# Patient Record
Sex: FEMALE | Race: WHITE
[De-identification: names, ages, dates, MRNs, and addresses within clinical notes are randomized per-mention and may not be internally consistent; named-entity substitution may affect disease eponyms.]

---

## 2017-12-28 ENCOUNTER — HOSPITAL ENCOUNTER (OUTPATIENT)
Dept: HOSPITAL 80 - CIMAGING | Age: 52
End: 2017-12-28
Attending: NURSE PRACTITIONER
Payer: COMMERCIAL

## 2017-12-28 DIAGNOSIS — Z12.31: Primary | ICD-10-CM

## 2017-12-28 PROCEDURE — G0202 SCR MAMMO BI INCL CAD: HCPCS

## 2018-12-12 ENCOUNTER — HOSPITAL ENCOUNTER (OUTPATIENT)
Dept: HOSPITAL 80 - FIMAGING | Age: 53
Discharge: HOME | End: 2018-12-12
Attending: NEUROLOGICAL SURGERY
Payer: COMMERCIAL

## 2018-12-12 VITALS — SYSTOLIC BLOOD PRESSURE: 109 MMHG | DIASTOLIC BLOOD PRESSURE: 69 MMHG

## 2018-12-12 DIAGNOSIS — M43.16: Primary | ICD-10-CM

## 2018-12-12 DIAGNOSIS — M99.53: ICD-10-CM

## 2018-12-12 DIAGNOSIS — M48.07: ICD-10-CM

## 2018-12-12 NOTE — PDPROPOC
Sedation Plan of Care


Sedation Plan of Care: vital signs stable, mental status noted, patient 

educated of risks, benefits, alternatives, patient can tolerate sedation


ASA Classification: ASA 1


Planned drugs: midazolam


Mallampati Score: Class 1


Mallampati Reference Image: 





Patient passed 3-3-2 rule?: Yes

## 2018-12-12 NOTE — PDGENHP
History & Physical


Chief Complaint: claustro, lumbar spine MRI


Cardiorespiratory Assessment: RRR, clear

## 2019-04-22 ENCOUNTER — HOSPITAL ENCOUNTER (OUTPATIENT)
Dept: HOSPITAL 80 - EMCIMAGING | Age: 54
End: 2019-04-22
Attending: PHYSICIAN ASSISTANT
Payer: COMMERCIAL

## 2019-04-22 DIAGNOSIS — Z13.820: ICD-10-CM

## 2019-04-22 DIAGNOSIS — Z78.0: ICD-10-CM

## 2019-04-22 DIAGNOSIS — M85.80: ICD-10-CM

## 2019-04-22 DIAGNOSIS — Z12.31: Primary | ICD-10-CM
